# Patient Record
Sex: FEMALE | Race: BLACK OR AFRICAN AMERICAN | NOT HISPANIC OR LATINO | URBAN - METROPOLITAN AREA
[De-identification: names, ages, dates, MRNs, and addresses within clinical notes are randomized per-mention and may not be internally consistent; named-entity substitution may affect disease eponyms.]

---
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
## 2022-03-10 NOTE — ED PROVIDER NOTE - PMH
No pertinent past medical history Doxepin Pregnancy And Lactation Text: This medication is Pregnancy Category C and it isn't known if it is safe during pregnancy. It is also excreted in breast milk and breast feeding isn't recommended.

## 2025-01-30 ENCOUNTER — OUTPATIENT (OUTPATIENT)
Dept: OUTPATIENT SERVICES | Facility: HOSPITAL | Age: 25
LOS: 1 days | End: 2025-01-30
Payer: COMMERCIAL

## 2025-01-30 VITALS
OXYGEN SATURATION: 98 % | RESPIRATION RATE: 16 BRPM | TEMPERATURE: 98 F | SYSTOLIC BLOOD PRESSURE: 117 MMHG | WEIGHT: 220.02 LBS | HEIGHT: 67 IN | HEART RATE: 68 BPM | DIASTOLIC BLOOD PRESSURE: 78 MMHG

## 2025-01-30 DIAGNOSIS — Z98.890 OTHER SPECIFIED POSTPROCEDURAL STATES: Chronic | ICD-10-CM

## 2025-01-30 DIAGNOSIS — N84.0 POLYP OF CORPUS UTERI: ICD-10-CM

## 2025-01-30 DIAGNOSIS — N93.9 ABNORMAL UTERINE AND VAGINAL BLEEDING, UNSPECIFIED: ICD-10-CM

## 2025-01-30 DIAGNOSIS — Z01.818 ENCOUNTER FOR OTHER PREPROCEDURAL EXAMINATION: ICD-10-CM

## 2025-01-30 LAB
ALBUMIN SERPL ELPH-MCNC: 3.9 G/DL — SIGNIFICANT CHANGE UP (ref 3.5–5)
ALP SERPL-CCNC: 56 U/L — SIGNIFICANT CHANGE UP (ref 40–120)
ALT FLD-CCNC: 20 U/L DA — SIGNIFICANT CHANGE UP (ref 10–60)
ANION GAP SERPL CALC-SCNC: 6 MMOL/L — SIGNIFICANT CHANGE UP (ref 5–17)
AST SERPL-CCNC: 15 U/L — SIGNIFICANT CHANGE UP (ref 10–40)
BILIRUB SERPL-MCNC: 0.3 MG/DL — SIGNIFICANT CHANGE UP (ref 0.2–1.2)
BLD GP AB SCN SERPL QL: SIGNIFICANT CHANGE UP
BUN SERPL-MCNC: 12 MG/DL — SIGNIFICANT CHANGE UP (ref 7–18)
CALCIUM SERPL-MCNC: 9.4 MG/DL — SIGNIFICANT CHANGE UP (ref 8.4–10.5)
CHLORIDE SERPL-SCNC: 106 MMOL/L — SIGNIFICANT CHANGE UP (ref 96–108)
CO2 SERPL-SCNC: 26 MMOL/L — SIGNIFICANT CHANGE UP (ref 22–31)
CREAT SERPL-MCNC: 0.88 MG/DL — SIGNIFICANT CHANGE UP (ref 0.5–1.3)
EGFR: 95 ML/MIN/1.73M2 — SIGNIFICANT CHANGE UP
GLUCOSE SERPL-MCNC: 114 MG/DL — HIGH (ref 70–99)
HCT VFR BLD CALC: 38.1 % — SIGNIFICANT CHANGE UP (ref 34.5–45)
HGB BLD-MCNC: 12.4 G/DL — SIGNIFICANT CHANGE UP (ref 11.5–15.5)
MCHC RBC-ENTMCNC: 27 PG — SIGNIFICANT CHANGE UP (ref 27–34)
MCHC RBC-ENTMCNC: 32.5 G/DL — SIGNIFICANT CHANGE UP (ref 32–36)
MCV RBC AUTO: 82.8 FL — SIGNIFICANT CHANGE UP (ref 80–100)
NRBC # BLD: 0 /100 WBCS — SIGNIFICANT CHANGE UP (ref 0–0)
NRBC BLD-RTO: 0 /100 WBCS — SIGNIFICANT CHANGE UP (ref 0–0)
PLATELET # BLD AUTO: 290 K/UL — SIGNIFICANT CHANGE UP (ref 150–400)
POTASSIUM SERPL-MCNC: 4.6 MMOL/L — SIGNIFICANT CHANGE UP (ref 3.5–5.3)
POTASSIUM SERPL-SCNC: 4.6 MMOL/L — SIGNIFICANT CHANGE UP (ref 3.5–5.3)
PROT SERPL-MCNC: 7.3 G/DL — SIGNIFICANT CHANGE UP (ref 6–8.3)
RBC # BLD: 4.6 M/UL — SIGNIFICANT CHANGE UP (ref 3.8–5.2)
RBC # FLD: 12.7 % — SIGNIFICANT CHANGE UP (ref 10.3–14.5)
SODIUM SERPL-SCNC: 138 MMOL/L — SIGNIFICANT CHANGE UP (ref 135–145)
WBC # BLD: 4.53 K/UL — SIGNIFICANT CHANGE UP (ref 3.8–10.5)
WBC # FLD AUTO: 4.53 K/UL — SIGNIFICANT CHANGE UP (ref 3.8–10.5)

## 2025-01-30 RX ORDER — BACTERIOSTATIC SODIUM CHLORIDE 0.9 %
3 VIAL (ML) INJECTION EVERY 8 HOURS
Refills: 0 | Status: DISCONTINUED | OUTPATIENT
Start: 2025-02-06 | End: 2025-02-06

## 2025-01-30 NOTE — H&P PST ADULT - NSICDXPASTMEDICALHX_GEN_ALL_CORE_FT
PAST MEDICAL HISTORY:  No pertinent past medical history      PAST MEDICAL HISTORY:  Polyp of corpus uteri

## 2025-01-30 NOTE — H&P PST ADULT - PROBLEM SELECTOR PLAN 1
Pt schedule for Hysteroscopy with Polypectomy and Intrauterine Device Insertion on 2/6/25 with Dr Raymundo     Labs drawn by PCP - will f/u result    Pt was  instructed to stop aspirin/ecotrin and all over the counter medication including vitamins and herbal supplements one week prior to surgery   Instructions given on the use of 4% chlorhexidine wash and Pt verbalized understanding of same   Pt Instructed to have nothing by mouth starting midnight day before surgery  Patient is to expect a phone call day before surgery between the hours of 430- 630pm giving arrival time for surgery   Written and verbal preoperative instructions given to patient with understanding verbalized.     Patient today with STOP bang score 3  Low  risk for SHILO Pt schedule for Hysteroscopy with Polypectomy and Intrauterine Device Insertion on 2/6/25 with Dr Raymundo     Labs drawn by PCP - will f/u result    Pt was  instructed to stop aspirin/ecotrin and all over the counter medication including vitamins and herbal supplements one week prior to surgery   Instructions given on the use of 4% chlorhexidine wash and Pt verbalized understanding of same   Pt Instructed to have nothing by mouth starting midnight day before surgery  Patient is to expect a phone call day before surgery between the hours of 430- 630pm giving arrival time for surgery   Written and verbal preoperative instructions given to patient with understanding verbalized.     Patient today with STOP bang score 0  Low  risk for SHILO

## 2025-01-30 NOTE — H&P PST ADULT - ASSESSMENT
24 y.o female with no PMHx . Now with c/o heavy and painful menstruation. On w/o found to have Polyp of Corpus Uteri and now for schedule Hysteroscopy with Polypectomy and Intrauterine Device Insertion on 2/6/25 with Dr Raymundo  24 y.o female with  Polyp of Corpus Uteri and now for schedule Hysteroscopy with Polypectomy and Intrauterine Device Insertion on 2/6/25 with Dr Breanne FIELDS 0

## 2025-01-30 NOTE — H&P PST ADULT - HISTORY OF PRESENT ILLNESS
24 y.o female with no PMHx . Now with c/o heavy and painful menstruation. On w/o found to have Polyp of Corpus Uteri and now for schedule Hysteroscopy with Polypectomy and Intrauterine Device Insertion on 2/6/25 with Dr Raymundo

## 2025-01-30 NOTE — H&P PST ADULT - ATTENDING COMMENTS
23 yo with AUB-P, suspected endometriosis, / dysmenorrhea presents for an elective hysteroscopy, D&C, polypectomy, IUD Mirena insertion,  U 9 cm, EMS 13 mm with an ecogenic structure,   r/b/a were extensively discussed, risks including but not limited to pain, bleeding, infection, damage to adjacent organs, VTE, need for laparoscopy and laparotomy, inability to complete the procedure, need for blood transfusion. IUD-related complications were discussed as well: risk of malposition, need for a surgery to remove it, risk of failure and ectopic pregnancy.  Patient voiced understanding and agreed with the plan.     pmedvedeva

## 2025-01-31 PROCEDURE — 85027 COMPLETE CBC AUTOMATED: CPT

## 2025-01-31 PROCEDURE — G0463: CPT

## 2025-01-31 PROCEDURE — 80053 COMPREHEN METABOLIC PANEL: CPT

## 2025-01-31 PROCEDURE — 86850 RBC ANTIBODY SCREEN: CPT

## 2025-01-31 PROCEDURE — 36415 COLL VENOUS BLD VENIPUNCTURE: CPT

## 2025-01-31 PROCEDURE — 86901 BLOOD TYPING SEROLOGIC RH(D): CPT

## 2025-01-31 PROCEDURE — 86900 BLOOD TYPING SEROLOGIC ABO: CPT

## 2025-02-06 ENCOUNTER — OUTPATIENT (OUTPATIENT)
Dept: OUTPATIENT SERVICES | Facility: HOSPITAL | Age: 25
LOS: 1 days | End: 2025-02-06
Payer: COMMERCIAL

## 2025-02-06 VITALS
DIASTOLIC BLOOD PRESSURE: 72 MMHG | SYSTOLIC BLOOD PRESSURE: 121 MMHG | RESPIRATION RATE: 15 BRPM | HEART RATE: 63 BPM | TEMPERATURE: 98 F | OXYGEN SATURATION: 99 %

## 2025-02-06 VITALS
OXYGEN SATURATION: 100 % | TEMPERATURE: 99 F | HEIGHT: 67 IN | WEIGHT: 220.02 LBS | DIASTOLIC BLOOD PRESSURE: 67 MMHG | RESPIRATION RATE: 16 BRPM | SYSTOLIC BLOOD PRESSURE: 121 MMHG | HEART RATE: 68 BPM

## 2025-02-06 DIAGNOSIS — N93.9 ABNORMAL UTERINE AND VAGINAL BLEEDING, UNSPECIFIED: ICD-10-CM

## 2025-02-06 DIAGNOSIS — Z98.890 OTHER SPECIFIED POSTPROCEDURAL STATES: Chronic | ICD-10-CM

## 2025-02-06 DIAGNOSIS — N84.0 POLYP OF CORPUS UTERI: ICD-10-CM

## 2025-02-06 LAB
BLD GP AB SCN SERPL QL: SIGNIFICANT CHANGE UP
HCG UR QL: NEGATIVE — SIGNIFICANT CHANGE UP

## 2025-02-06 PROCEDURE — 86850 RBC ANTIBODY SCREEN: CPT

## 2025-02-06 PROCEDURE — 36415 COLL VENOUS BLD VENIPUNCTURE: CPT

## 2025-02-06 PROCEDURE — 86900 BLOOD TYPING SEROLOGIC ABO: CPT

## 2025-02-06 PROCEDURE — 58300 INSERT INTRAUTERINE DEVICE: CPT

## 2025-02-06 PROCEDURE — 58558 HYSTEROSCOPY BIOPSY: CPT

## 2025-02-06 PROCEDURE — 88305 TISSUE EXAM BY PATHOLOGIST: CPT | Mod: 26

## 2025-02-06 PROCEDURE — 81025 URINE PREGNANCY TEST: CPT

## 2025-02-06 PROCEDURE — 86901 BLOOD TYPING SEROLOGIC RH(D): CPT

## 2025-02-06 PROCEDURE — 88305 TISSUE EXAM BY PATHOLOGIST: CPT

## 2025-02-06 DEVICE — BIRTH CONTROL IUD MIRENA: Type: IMPLANTABLE DEVICE | Status: FUNCTIONAL

## 2025-02-06 RX ORDER — FENTANYL CITRATE 50 UG/ML
50 INJECTION INTRAMUSCULAR; INTRAVENOUS
Refills: 0 | Status: DISCONTINUED | OUTPATIENT
Start: 2025-02-06 | End: 2025-02-06

## 2025-02-06 RX ORDER — ONDANSETRON 4 MG/1
4 TABLET, ORALLY DISINTEGRATING ORAL ONCE
Refills: 0 | Status: DISCONTINUED | OUTPATIENT
Start: 2025-02-06 | End: 2025-02-06

## 2025-02-06 RX ORDER — SODIUM CHLORIDE 9 G/ML
1000 INJECTION, SOLUTION INTRAVENOUS
Refills: 0 | Status: DISCONTINUED | OUTPATIENT
Start: 2025-02-06 | End: 2025-02-06

## 2025-02-06 RX ORDER — ONDANSETRON 4 MG/1
8 TABLET, ORALLY DISINTEGRATING ORAL EVERY 8 HOURS
Refills: 0 | Status: ACTIVE | OUTPATIENT
Start: 2025-02-06 | End: 2025-02-07

## 2025-02-06 RX ORDER — FENTANYL CITRATE 50 UG/ML
25 INJECTION INTRAMUSCULAR; INTRAVENOUS
Refills: 0 | Status: DISCONTINUED | OUTPATIENT
Start: 2025-02-06 | End: 2025-02-06

## 2025-02-06 RX ADMIN — Medication 3 MILLILITER(S): at 09:46

## 2025-02-06 RX ADMIN — FENTANYL CITRATE 25 MICROGRAM(S): 50 INJECTION INTRAMUSCULAR; INTRAVENOUS at 13:29

## 2025-02-06 NOTE — ASU PREOP CHECKLIST - NOTHING BY MOUTH SINCE
Patient's insurance will not cover Sucralfate Suspension without patient first having a documented trial and failure of the following medications:    Sucralfatae TABLETS    Please advise.     When changing to a preferred medication send a new prescription to patient's pharmacy, discontinue non preferred medication, and update this encounter.    Thank You     05-Feb-2025 23:00

## 2025-02-06 NOTE — ASU DISCHARGE PLAN (ADULT/PEDIATRIC) - CARE PROVIDER_API CALL
Alessandra Raymundo  Obstetrics and Gynecology  [address] [place] [id number]  Phone: [phone]  Fax: [phone]  Established Patient  Follow Up Time: 2 weeks

## 2025-02-06 NOTE — ASU DISCHARGE PLAN (ADULT/PEDIATRIC) - FINANCIAL ASSISTANCE
[facility] provides services at a reduced cost to those who are determined to be eligible through [facility]’s financial assistance program. Information regarding [facility]’s financial assistance program can be found by going to [email or web address] or by calling [phone].

## 2025-02-06 NOTE — ASU PATIENT PROFILE, ADULT - FALL HARM RISK - UNIVERSAL INTERVENTIONS
Bed in lowest position, wheels locked, appropriate side rails in place/Call bell, personal items and telephone in reach/Instruct patient to call for assistance before getting out of bed or chair/Non-slip footwear when patient is out of bed/[place] to call system/Physically safe environment - no spills, clutter or unnecessary equipment/Purposeful Proactive Rounding/Room/bathroom lighting operational, light cord in reach

## 2025-02-11 LAB — SURGICAL PATHOLOGY STUDY: SIGNIFICANT CHANGE UP

## (undated) DEVICE — TUBING SET TUR BLADDER IRRIGATION Y-TYPE 81"

## (undated) DEVICE — DRAPE LEGGINGS 6" CUFF 28X43"

## (undated) DEVICE — DRAPE TOWEL BLUE 17" X 24"

## (undated) DEVICE — DRAPE 1/2 SHEET 40X57"

## (undated) DEVICE — PACK PERI GYN

## (undated) DEVICE — SOL IRR POUR H2O 500ML

## (undated) DEVICE — URETERAL CATH RED RUBBER 16FR (ORANGE)

## (undated) DEVICE — PREP BETADINE KIT

## (undated) DEVICE — DRAPE LIGHT HANDLE COVER (BLUE)

## (undated) DEVICE — SOL IRR POUR NS 0.9% 1500ML

## (undated) DEVICE — LAP PAD W RING 18 X 18"

## (undated) DEVICE — TUBING TUR 2 PRONG

## (undated) DEVICE — SOL IRR SORBITOL 3% 3000ML

## (undated) DEVICE — WARMING BLANKET UPPER ADULT

## (undated) DEVICE — VENODYNE/SCD SLEEVE CALF MEDIUM

## (undated) DEVICE — SOL INJ NS 0.9% 500ML 1-PORT

## (undated) DEVICE — ELCTR CUTTING LOOP MONOPOLAR ANGLED 24F

## (undated) DEVICE — GOWN LG

## (undated) DEVICE — GLV 7.5 PROTEXIS (WHITE)

## (undated) DEVICE — TUBING STRYKER HYSTEROSCOPY INFLOW OUTFLOW